# Patient Record
(demographics unavailable — no encounter records)

---

## 2025-05-05 NOTE — HISTORY OF PRESENT ILLNESS
[Date: ___] : Date of most recent diagnostic polysomnogram: [unfilled] [AHI: ___ per hour] : Apnea-hypopnea index:  [unfilled] per hour [T90%: ___] : T90%: [unfilled]% [Goran desatuation%: ___] : Goran desaturation:  [unfilled]% [FreeTextEntry1] : Lona Valdivia 56 year old man with history of bicuspid aortic valve, htn is here in the sleep center to address sleep apnea.   symptoms prior to cpap - Patient is sleepy with [place] sleepiness score of 12.  Patient has very loud snoring which disturbs his wife, does not have any witnessed apneas.  Patient's bedtime is around 11.30 PM wakes up in the morning around 5-7.30 AM.   He feels tired when he wakes up.  Patient drinks 3 cans of diet soda. Patient has few headaches a month, no history of nocturia. He is not sleepy while driving.  sleep study showed moderate sleep apnea with ahi of 16.2, discussed results in detail with the patient.  symptoms on the cpap - Patient is sleepy with [place] sleepiness score of 12.  Patient is not snoring with cpap.  Patient's bedtime is around 11.30 PM wakes up in the morning around 5.40 -7.30 AM.   He feels rested somedays when he wakes up.  Patient drinks 3 cans of diet soda. Patient has few headaches a month, no history of nocturia. He is not sleepy while driving.  download from cpap ahi 0.9 usage 8 hrs pressure 6-8 cm dme [facility] uses nasal pillows

## 2025-05-05 NOTE — PHYSICAL EXAM
[General Appearance - Well Developed] : well developed [General Appearance - Well Nourished] : well nourished [Enlarged Base of the Tongue] : enlargement of the base of the tongue [III] : III [Heart Sounds] : normal S1 and S2 [Systolic grade ___/6] : A grade [unfilled]/6 systolic murmur was heard. [] : no respiratory distress [Auscultation Breath Sounds / Voice Sounds] : lungs were clear to auscultation bilaterally [No Focal Deficits] : no focal deficits [Oriented To Time, Place, And Person] : oriented to person, place, and time [Memory Recent] : recent memory was not impaired [FreeTextEntry1] : narrow airway

## 2025-05-05 NOTE — ASSESSMENT
[FreeTextEntry1] : 56 year  old man  with sleep apnea is doing well with the CPAP.  Patient is compliant with the CPAP and benefited significantly with the CPAP.